# Patient Record
Sex: FEMALE | Race: AMERICAN INDIAN OR ALASKA NATIVE
[De-identification: names, ages, dates, MRNs, and addresses within clinical notes are randomized per-mention and may not be internally consistent; named-entity substitution may affect disease eponyms.]

---

## 2019-06-04 ENCOUNTER — HOSPITAL ENCOUNTER (EMERGENCY)
Dept: HOSPITAL 5 - ED | Age: 39
Discharge: HOME | End: 2019-06-04
Payer: COMMERCIAL

## 2019-06-04 VITALS — SYSTOLIC BLOOD PRESSURE: 144 MMHG | DIASTOLIC BLOOD PRESSURE: 88 MMHG
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
## 2019-06-04 DIAGNOSIS — Z86.718: ICD-10-CM
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
## 2019-06-04 DIAGNOSIS — Z88.6: ICD-10-CM

## 2019-06-04 DIAGNOSIS — G43.909: ICD-10-CM
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
## 2019-06-04 DIAGNOSIS — F17.200: ICD-10-CM

## 2019-06-04 DIAGNOSIS — S76.912A: Primary | ICD-10-CM

## 2019-06-04 DIAGNOSIS — S76.911A: ICD-10-CM
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
## 2019-06-04 PROCEDURE — 93970 EXTREMITY STUDY: CPT

## 2019-06-04 PROCEDURE — 99284 EMERGENCY DEPT VISIT MOD MDM: CPT

## 2019-06-04 PROCEDURE — 85379 FIBRIN DEGRADATION QUANT: CPT

## 2019-06-04 PROCEDURE — 36415 COLL VENOUS BLD VENIPUNCTURE: CPT

## 2019-06-04 NOTE — EMERGENCY DEPARTMENT REPORT
HPI





- General


Chief Complaint: Extremity Injury, Lower


Time Seen by Provider: 06/04/19 11:44





- HPI


HPI: 





This is a 38-year-old female presents to the complaining of pain to the 

bilateral lower legs since Saturday.  Patient states that she is disturbance for

prolonged hours as she is a  at a restaurant.


Patient states pain is localized to her calf muscles.  Patient states due to her

history of blood clot she presents today due to be evaluated.  She denies any 

swelling, trauma prolonged sitting, shortness of breath or chest pain.





ED Past Medical Hx





- Past Medical History


Hx Congestive Heart Failure: No


Hx Diabetes: No


Hx Deep Vein Thrombosis: Yes


Hx Pulmonary Embolism: Yes


Hx Headaches / Migraines: Yes


Hx Asthma: No


Hx COPD: No


Hx HIV: No





- Surgical History


Past Surgical History?: No





- Social History


Smoking Status: Current Every Day Smoker


Substance Use Type: None





- Medications


Home Medications: 


                                Home Medications











 Medication  Instructions  Recorded  Confirmed  Last Taken  Type


 


Phendimetrazine Tartrate 35 mg PO TIDAC 03/28/14 03/28/14 03/21/14 History


 


Enoxaparin [Lovenox] 120 mg SUB-Q Q12HR #10 syringe 03/31/14  Unknown Rx


 


Warfarin [Coumadin] 10 mg PO DAILY@1700 #10 tablet 03/31/14  Unknown Rx


 


Cyclobenzaprine [Flexeril] 10 mg PO QHS PRN #15 tablet 06/04/19  Unknown Rx


 


Ibuprofen [Motrin] 800 mg PO Q8HR #20 tablet 06/04/19  Unknown Rx














ED Review of Systems


ROS: 


Stated complaint: BI LEG PAIN


Other details as noted in HPI





Comment: All other systems reviewed and negative





Physical Exam





- Physical Exam


Vital Signs: 


                                   Vital Signs











  06/04/19 06/04/19





  11:48 11:50


 


Temperature 98.0 F 


 


Pulse Rate 81 


 


Respiratory 16 





Rate  


 


Blood Pressure  144/88





[Right]  


 


O2 Sat by Pulse 99 





Oximetry  











Physical Exam: 





GENERAL: Alert and oriented x3, no apparent distress, Normal Gait, atraumatic.


HEAD: Head is normocephalic and a-traumatic.











EXTREMITIES/MUSCULOSKELETAL: No cyanosis, clubbing, rash, lesions or pitting 

edema bilaterally. Full ROM bilaterally. UE/LE Pulses 2+ bilaterally.  LE and UE

 5+ strength bilaterally, Homans sign negative bilaterally, tenderness palpation

 of calf muscles bilaterally.  No bruising no erythema


NEUROLOGIC:  The patient is cooperative with no focal neurologic deficits. 


SKIN:  Warm and dry, No lesions, No ulceration or induration present.





ED Course


                                   Vital Signs











  06/04/19 06/04/19





  11:48 11:50


 


Temperature 98.0 F 


 


Pulse Rate 81 


 


Respiratory 16 





Rate  


 


Blood Pressure  144/88





[Right]  


 


O2 Sat by Pulse 99 





Oximetry  














ED Medical Decision Making





- Medical Decision Making





38-year-old female presents with myalgia of the calf muscles.


D-dimer obtained, ultrasound of the lower extremities obtain


All tests negative


Discussed findings with patient.


Discussed the patient to follow up with primary care physician.


Vital signs are normal she is acute distress


Discussed motion as needed for pain and Flexeril for muscle spasms muscle pain. 

 Signed discussed the patient to take Flexeril at night and not to use and she 

drink alcohol while taking


Critical care attestation.: 


If time is entered above; I have spent that time in minutes in the direct care 

of this critically ill patient, excluding procedure time.








ED Disposition


Clinical Impression: 


 Strain of calf muscle, Myalgia





Disposition: DC-01 TO HOME OR SELFCARE


Is pt being admited?: No


Does the pt Need Aspirin: No


Condition: Stable


Instructions:  Muscle Strain (ED), Musculoskeletal Pain (ED), Trigger Point Pain

 (ED)


Additional Instructions: 


Make sure to follow up with the primary care physician as discussed.


Take all your medications as you've been prescribed.


If you have any worsening symptoms or develop new symptoms please return to ED 

immediately.


Prescriptions: 


Cyclobenzaprine [Flexeril] 10 mg PO QHS PRN #15 tablet


 PRN Reason: Muscle Spasm


Ibuprofen [Motrin] 800 mg PO Q8HR #20 tablet


Referrals: 


[facility], [place] [Primary Care Provider] - 3-5 Days


Forms:  Work/School Release Form(ED)


Time of Disposition: 14:00

## 2019-06-04 NOTE — EMERGENCY DEPARTMENT REPORT
Chief Complaint: Extremity Injury, Lower


Stated Complaint: BI LEG PAIN


Time Seen by Provider: 06/04/19 11:44





- HPI


History of Present Illness: 





This is a 38 y.o. F. that presents to the ER with bilateral calf pain x 3 days.





LMP 5/20/2019





PMH DVT





Patient states she called her doctor and they told her to come to the ER to r/o 

DVT due to past history.





- Exam


Vital Signs: 


                                   Vital Signs











  06/04/19 06/04/19





  11:48 11:50


 


Temperature 98.0 F 


 


Pulse Rate 81 


 


Respiratory 16 





Rate  


 


Blood Pressure  144/88





[Right]  


 


O2 Sat by Pulse 99 





Oximetry  











MSE screening note: 


Focused history and physical exam performed.


Due to findings the following was ordered:





This initial assessment/diagnostic orders/clinical plan/treatment(s) is/are 

subject to change based on patient's health status, clinical progression and re-

assessment by fellow clinical providers in the ED.  Further treatment and workup

 at subsequent clinical providers discretion.  Patient/guardians urged not to 

elope from the ED as their condition may be serious if not clinically assessed 

and managed.  Initial orders include:





Doppler and d-dimer





ED Disposition for MSE


Condition: Stable

## 2019-06-04 NOTE — VASCULAR LAB REPORT
PROCEDURE:  VL VENOUS DUPLEX LE BILAT 

  

TECHNIQUE:  Duplex Doppler sonography of the BILATERAL lower extremities. Grey scale imaging with and
 without compression, spectral waveform analysis with and without augmentation, and color flow Dopple
r were employed.  

 

HISTORY: bilateral calf pain and swelling, r/o DVT 

 

COMPARISONS:  None  

 

FINDINGS:  

 

RIGHT lower EXTREMITY: 

 

Deep Venous Thrombus:  None  

Superficial Venous Thrombus:  None  

Venous valvular incompetence:  None  

Soft tissue abnormality:  None  

 

LEFT lower EXTREMITY: 

 

Deep Venous Thrombus:  None  

Superficial Venous Thrombus:  None  

Venous valvular incompetence:  None  

Soft tissue abnormality:  None  

 

IMPRESSION:   

 

No evidence of deep venous thrombosis in the bilateral lower extremities. 

 

This document is electronically signed by Radha Payne MD., June 4 2019 12:37:02 PM ET

## 2020-01-23 ENCOUNTER — HOSPITAL ENCOUNTER (EMERGENCY)
Dept: HOSPITAL 5 - ED | Age: 40
Discharge: HOME | End: 2020-01-23
Payer: COMMERCIAL

## 2020-01-23 VITALS — DIASTOLIC BLOOD PRESSURE: 94 MMHG | SYSTOLIC BLOOD PRESSURE: 133 MMHG

## 2020-01-23 DIAGNOSIS — Z79.899: ICD-10-CM

## 2020-01-23 DIAGNOSIS — I82.409: ICD-10-CM

## 2020-01-23 DIAGNOSIS — R07.89: ICD-10-CM

## 2020-01-23 DIAGNOSIS — Z88.4: ICD-10-CM

## 2020-01-23 DIAGNOSIS — S16.1XXA: Primary | ICD-10-CM
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
## 2020-01-23 DIAGNOSIS — G43.909: ICD-10-CM
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
## 2020-01-23 DIAGNOSIS — F17.200: ICD-10-CM

## 2020-01-23 DIAGNOSIS — I26.99: ICD-10-CM
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
## 2020-01-23 DIAGNOSIS — Z79.1: ICD-10-CM

## 2020-01-23 PROCEDURE — 72040 X-RAY EXAM NECK SPINE 2-3 VW: CPT
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
